# Patient Record
Sex: MALE | Race: BLACK OR AFRICAN AMERICAN | NOT HISPANIC OR LATINO | URBAN - METROPOLITAN AREA
[De-identification: names, ages, dates, MRNs, and addresses within clinical notes are randomized per-mention and may not be internally consistent; named-entity substitution may affect disease eponyms.]

---

## 2017-12-18 ENCOUNTER — CLINICAL SUPPORT (OUTPATIENT)
Dept: AUDIOLOGY | Facility: CLINIC | Age: 27
End: 2017-12-18
Payer: MEDICARE

## 2017-12-18 DIAGNOSIS — H90.3 SENSORINEURAL HEARING LOSS, BILATERAL: Primary | ICD-10-CM

## 2017-12-18 PROCEDURE — 92603 COCHLEAR IMPLT F/UP EXAM 7/>: CPT | Mod: PBBFAC | Performed by: PHYSICIAN ASSISTANT

## 2017-12-18 NOTE — PROGRESS NOTES
[employer] Programming Session:    Agustín was in today with his dad and his sister.  I have not seen Agustín in many years.  He arrived today wearing his right sided N5 processor but it was not functioning. We determined that he had a bad cable and he needed a stronger magnet to keep the coil on his head.  Once we replaced the cable and put a #5 magnet, Agustín was appreciative of the sound he was receiving.      We did make some changes to his programs:  P1- Increased sound  P2- Increased sound  P3- Focus, P1 parent map  P4- Old P4, no changes, softest program    Recommend:  1.  Continued and consistent use of his N5 processing unit  2.  Annual programming visits  3.  Annual audiological testing  4.  Begin Upgrade process for new sound processor.